# Patient Record
Sex: FEMALE | Race: WHITE
[De-identification: names, ages, dates, MRNs, and addresses within clinical notes are randomized per-mention and may not be internally consistent; named-entity substitution may affect disease eponyms.]

---

## 2020-07-23 ENCOUNTER — HOSPITAL ENCOUNTER (INPATIENT)
Dept: HOSPITAL 1 - ED | Age: 50
LOS: 2 days | Discharge: HOME | DRG: 871 | End: 2020-07-25
Attending: HOSPITALIST | Admitting: HOSPITALIST
Payer: COMMERCIAL

## 2020-07-23 VITALS — DIASTOLIC BLOOD PRESSURE: 86 MMHG | SYSTOLIC BLOOD PRESSURE: 124 MMHG

## 2020-07-23 VITALS — WEIGHT: 197 LBS | BODY MASS INDEX: 36.25 KG/M2 | HEIGHT: 62 IN

## 2020-07-23 VITALS — SYSTOLIC BLOOD PRESSURE: 115 MMHG | DIASTOLIC BLOOD PRESSURE: 82 MMHG

## 2020-07-23 DIAGNOSIS — J12.89: ICD-10-CM

## 2020-07-23 DIAGNOSIS — J96.01: ICD-10-CM

## 2020-07-23 DIAGNOSIS — A41.9: Primary | ICD-10-CM

## 2020-07-23 DIAGNOSIS — U07.1: ICD-10-CM

## 2020-07-23 DIAGNOSIS — Z88.5: ICD-10-CM

## 2020-07-23 DIAGNOSIS — Z88.8: ICD-10-CM

## 2020-07-23 DIAGNOSIS — N17.0: ICD-10-CM

## 2020-07-23 LAB
ALBUMIN SERPL-MCNC: 3.8 G/DL (ref 3.4–5)
ALP SERPL-CCNC: 110 U/L (ref 46–116)
ALT SERPL-CCNC: 31 U/L (ref 14–59)
AST SERPL-CCNC: 25 U/L (ref 15–37)
BASOPHILS NFR BLD: 0.3 % (ref 0–2)
BILIRUB SERPL-MCNC: 0.62 MG/DL (ref 0.2–1)
BUN SERPL-MCNC: 11 MG/DL (ref 7–18)
CALCIUM SERPL-MCNC: 9.2 MG/DL (ref 8.5–10.1)
CHLORIDE SERPL-SCNC: 104 MMOL/L (ref 98–107)
CHOLEST SERPL-MCNC: 159 MG/DL (ref ?–200)
CHOLEST/HDLC SERPL: 2.6 MG/DL
CO2 SERPL-SCNC: 27.9 MMOL/L (ref 21–32)
CREAT SERPL-MCNC: 0.9 MG/DL (ref 0.6–1)
CRP SERPL-MCNC: 3.8 MG/DL (ref ?–0.9)
ERYTHROCYTE [DISTWIDTH] IN BLOOD BY AUTOMATED COUNT: 15 % (ref 11.5–14.5)
GFR SERPLBLD BASED ON 1.73 SQ M-ARVRAT: > 60 ML/MIN
GLUCOSE SERPL-MCNC: 92 MG/DL (ref 74–106)
HDLC SERPL-MCNC: 62 MG/DL (ref 40–60)
LDH SERPL-CCNC: 228 U/L (ref 100–190)
MAGNESIUM SERPL-MCNC: 2.1 MG/DL (ref 1.8–2.4)
MICROSCOPIC UR-IMP: YES
PHOSPHATE SERPL-MCNC: 4 MG/DL (ref 2.5–4.9)
PLATELET # BLD: 284 X10^3MCL (ref 130–400)
POTASSIUM SERPL-SCNC: 3.7 MMOL/L (ref 3.5–5.1)
PROT SERPL-MCNC: 8.5 G/DL (ref 6.4–8.2)
RBC # UR STRIP.AUTO: NEGATIVE /UL
SODIUM SERPL-SCNC: 142 MMOL/L (ref 136–145)
T3 SERPL-MCNC: 0.96 NG/ML
T3RU NFR SERPL: 28 % UPTAKE (ref 30–39)
T4 FREE SERPL-MCNC: 0.94 NG/DL (ref 0.76–1.46)
T4 SERPL-MCNC: 7.1 UG/DL (ref 4.7–13.3)
T4/T3 UPTAKE INDEX SERPL: 2 UG/DL (ref 1.4–4.5)
TRIGL SERPL-MCNC: 60 MG/DL (ref ?–150)
UA SPECIFIC GRAVITY: >=1.03 (ref 1–1.03)

## 2020-07-23 PROCEDURE — G0378 HOSPITAL OBSERVATION PER HR: HCPCS

## 2020-07-23 NOTE — NUR
DID NOT RECEIVE ABX FROM PHARMACY. CALLED 3X AND ALERTED CHARGE NURSE, WHO
ALSO CALLED.  ADMINISTRATION TIME WAS CHANGED BY PHARMACY TO 2100. WILL BE
ADMINISTERED BY NIGHT NURSE. PT WAS IN PAIN 7/10 (HEADACHE) AND WAS GIVEN
TYLENOL.  TEMP WAS ALSO 99.7. TYLENOL RELIEVED BOTH CONDITIONS. PT DENIED
PAIN ON REASSESSMENT. IV EQUIPMENT WITH FLUIDS WERE SET UP FOR IV ABX. FAMILY
MEMBER DROPPED OF PHONE  AND PERSONAL ITEMS, WHICH WERE GIVEN TO PT. PT
CURRENTLY COMFORTABLE. ON RA WITH 96% SAO2. NO ACUTE DISTRESS NOTED. DRY COUGH
STILL PRESENT, BUT REPORTED BY PT THAT IT HAD IMPROVED AFTER SOME REST.
COVID-19 TEST WAS ADMINISTERED AND DELIVERED TO LAB.  SAFETY PRECAUTIONS IN
PLACE. ENDORSED TO NIGHT NURSE.

## 2020-07-23 NOTE — NUR
PT BIB SELF C/C BODY ACHES COUGH ABD PAIN WITH N/V STS CRAMPING ALL OVER
AWAITING FOR DR NELY HICKEY

## 2020-07-23 NOTE — NUR
REPORT GIVEN FROM DAY RN. AXOX4. BREATHING EVEN ON RA SPO2 97% NO S/S OF
DISTRESS OR DISCOMFORT. PT IS  HAVING A NON PRODUCTIVE COUGH. GENERAL
WEAKNESS. RAC 20G SL PATENT, FLUSHED. TELE 49 NSR AT THIS TIME. HYPO ACTIVE
BOWELS. SOFT , NON TENDER ABD. CLEAR SKIN WARM AND DRY. STRONG PEDAL AND
RADIAL PULSES. VOIDS FREELY IN THE RESTROOM. BED IN LOW POSIION, SIDERAILS
UPX2, CALL LIGHT WITHIN REACH.

## 2020-07-23 NOTE — NUR
**PLEASE ENTER FULL NAMES OF LVN/RN**
 
          Patient data collected by (LVN):          ANTWAN BORJAS 
Assessment reviewed and completed by (RN):          FARZAD CONTI

## 2020-07-23 NOTE — NUR
PT RESTING. EYES CLOSED. AXOX4 NO S/S OF DISTRESS OR DISCOMFORT. BREATHING
EVEN ON 2L NC 98% IV ABX TOLERATED. MEDICATIONS GIVEN AS ORDERED. BED IN LOW
POSITION, SIDE RAILS UPX2, CALL LIGHT WITHIN REACH.

## 2020-07-24 VITALS — SYSTOLIC BLOOD PRESSURE: 117 MMHG | DIASTOLIC BLOOD PRESSURE: 75 MMHG

## 2020-07-24 VITALS — DIASTOLIC BLOOD PRESSURE: 78 MMHG | SYSTOLIC BLOOD PRESSURE: 108 MMHG

## 2020-07-24 VITALS — SYSTOLIC BLOOD PRESSURE: 129 MMHG | DIASTOLIC BLOOD PRESSURE: 78 MMHG

## 2020-07-24 VITALS — SYSTOLIC BLOOD PRESSURE: 126 MMHG | DIASTOLIC BLOOD PRESSURE: 76 MMHG

## 2020-07-24 VITALS — SYSTOLIC BLOOD PRESSURE: 115 MMHG | DIASTOLIC BLOOD PRESSURE: 77 MMHG

## 2020-07-24 LAB
BASOPHILS NFR BLD: 0.4 % (ref 0–2)
BUN SERPL-MCNC: 11 MG/DL (ref 7–18)
CALCIUM SERPL-MCNC: 8.5 MG/DL (ref 8.5–10.1)
CHLORIDE SERPL-SCNC: 108 MMOL/L (ref 98–107)
CO2 SERPL-SCNC: 28 MMOL/L (ref 21–32)
CREAT SERPL-MCNC: 0.7 MG/DL (ref 0.6–1)
ERYTHROCYTE [DISTWIDTH] IN BLOOD BY AUTOMATED COUNT: 15 % (ref 11.5–14.5)
GFR SERPLBLD BASED ON 1.73 SQ M-ARVRAT: > 60 ML/MIN
GLUCOSE SERPL-MCNC: 79 MG/DL (ref 74–106)
PLATELET # BLD: 247 X10^3MCL (ref 130–400)
POTASSIUM SERPL-SCNC: 4.3 MMOL/L (ref 3.5–5.1)
SODIUM SERPL-SCNC: 144 MMOL/L (ref 136–145)

## 2020-07-24 NOTE — NUR
SCHEDULED MEDS GIVEN AND TOLERATED WELL. TYLENOL PO PRN GIVEN FOR NON
RADIATING C/P. PT REPOSITIONED FOR COMFORT. WILL CONTINUE TO MONITOR.

## 2020-07-24 NOTE — NUR
PT TOLERATED MEDS WELL. PT REFUSED COLACE STATING SHE ALREADY HAS DIARRHEA AND
DOES NOT WANT A STOOL SOFTENER. PT IS ON ROOM AIR AND DENIES SOB. PT HAS NO
OTHER NEEDS AT THIS TIME AND HAS CALL LIGHT IN REACH. IV PATENT AND NO SIGNS
OF REDNESS OR SWELLING.

## 2020-07-24 NOTE — NUR
PT RESTING, EYES CLOSED. NO S/S OF DISTRESS OR DISCOMFORT. IV C/D/I NO S/S OF
INFILTRATION. PT BREATHING EVEN AND UNLABORED ON RA 99% SPO2. TELE 49, NSR NO
CP. BED IN LOW POSITION, SIDE RAILS UPX2 CALL LIGHT WITHIN REACH. PT AXOX4.

## 2020-07-24 NOTE — NUR
PT IS AAOX4. TELE 49 IN PLACE READING NSR. RESP EVEN AND UNLABORED.
 ON R/A WITH DRY COUGH NOTED. IV CATH TO RAC PATENT, SITE WNL. DENIES PAIN.
WILL ENDORSE ALL CARE TO NOC RN. BED IN LOWEST POSTION. CALL LIGHT WITHIN
REACH. SIDE RAILS UP X2.

## 2020-07-24 NOTE — NUR
PT IS AA0X4. TELE 49 IN PLACE READING NSR. RESP EVEN AND UNLABORED. LUNG
SOUNDS DIMINISHED BILATERAL BASES. ON R/A WITH DRY COUGH NOTED. NO SOB.
IV CATH TO RAC S/L, PATENT. SITE WNL. ABDOMEN SOFT, NONTENDER, NONDISTENDED.
BOWEL SOUNDS ACTIVE X 4 QUADS. PT HAS C/O OF POOR APPETITE AND GASTRIC
DISCOMFORT. WILL CONTINUE TO MONITOR. CALL LIGHT WITHIN REACH. BED IN LOWEST
POSITION.

## 2020-07-24 NOTE — NUR
RECIVED PT FROM AM NURSE. CALLED INTO ROOM. PT HAS NO NEEDS AT THIS TIME. WILL
FOLLOW UP WITH PM ASSESSMENT AND MEDS.

## 2020-07-24 NOTE — NUR
PT SITTING UP AT BEDSIDE EATING LUNCH. NO RESP DISTRESS NOTED. CALL LIGHT
WITHIN REACH. DENIES PAIN. PT GIVEN DIABETIC TEACHING PAMPLET FOR EDUCATION
AND SCHEDULED CLASSES.

## 2020-07-25 VITALS — SYSTOLIC BLOOD PRESSURE: 110 MMHG | DIASTOLIC BLOOD PRESSURE: 74 MMHG

## 2020-07-25 VITALS — DIASTOLIC BLOOD PRESSURE: 104 MMHG | SYSTOLIC BLOOD PRESSURE: 139 MMHG

## 2020-07-25 LAB
BASOPHILS NFR BLD: 0.4 % (ref 0–2)
BUN SERPL-MCNC: 13 MG/DL (ref 7–18)
CALCIUM SERPL-MCNC: 8.9 MG/DL (ref 8.5–10.1)
CHLORIDE SERPL-SCNC: 108 MMOL/L (ref 98–107)
CO2 SERPL-SCNC: 27.8 MMOL/L (ref 21–32)
CREAT SERPL-MCNC: 0.7 MG/DL (ref 0.6–1)
CRP SERPL-MCNC: 3.7 MG/DL (ref ?–0.9)
ERYTHROCYTE [DISTWIDTH] IN BLOOD BY AUTOMATED COUNT: 15.2 % (ref 11.5–14.5)
GFR SERPLBLD BASED ON 1.73 SQ M-ARVRAT: > 60 ML/MIN
GLUCOSE SERPL-MCNC: 84 MG/DL (ref 74–106)
PLATELET # BLD: 272 X10^3MCL (ref 130–400)
POTASSIUM SERPL-SCNC: 3.7 MMOL/L (ref 3.5–5.1)
SODIUM SERPL-SCNC: 146 MMOL/L (ref 136–145)

## 2020-07-25 NOTE — NUR
PT SLEPT IN INTERVALS THROUGHOUT THE NIGHT. PT REPORTED HEADACHE THIS AM AND
WAS MEDICATED PER EMAR. PT ALSO REPORTED INCREASED COUGHING DURING THE NIGHT
AND REQUESTED COUGH MEDICINE. RESIDENT WAS NOTIFIED AND ROBITUSSIN PO WAS
OBTAINED AND ADMINISTERED. PT HAS BEEN LAYING PRONE AS TOLERATED AND IS STILL
ON ROOM AIR. PT DENIES SOB AND DIFFICULTY BREATHING. PT INSTRUCTED TO ALERT
NURSE IF BECOMING SOB. PT HAS CALL LIGHT IN REACH, WILL ENDORSE CARE TO AM
NURSE.

## 2020-07-25 NOTE — NUR
PT IS AAOX4. TELE 49 IN PLACE READING NSR. RESP EVEN, SHALLOW, UNLABORED. LUNG
SOUNDS DIMINISHED BILATERAL BASES. ON R/A. DRY COUGH NOTED. IV CATH TO RAC
FLUSHED AND PATENT. SITE WNL. DENIES PAIN. NO DISTRESS NOTED. CALL LIGHT
WITHIN REACH. BED IN LOWEST POSITION. DROPLET PRECAUTIONS WILL BE MAINTAINED
THROUGH OUT THE DAY.

## 2020-07-25 NOTE — NUR
PT DISCHARGED TO HOME IN NO DISTRESS. DISCHARGE INSTRUCTIONS REVIEWED. ALL
QUESTIONS AND CONCERNS ADDRESS. PT INFORMED THAT ALL PRESCRIPTIONS ORDERED
HAVE BEEN SENT TO THE PT'S INDICATED PHARMACY. TELE 49 REMOVED AND GIVEN TO
MONITOR TECH. IV CATH TO RAC REMOVED INTACT. SITE WNL.  COVERED WITH CDI
DRESSING. PT DENIES PAIN UPON DISCHARGE, V/S WNL. PT TOOK ALL PERSONAL
BELONGINGS. PT TAKEN TO LOBBY IN W/C ACCOMPAINED BY THIS RN.

## 2020-07-25 NOTE — NUR
ADMINISTERED ROBITUSSIN PO TO PATIENT FOR COUGH. PT TOLERATED WELL. PT ON
ROOM AIR, DENIES SOB AND DIFFICULTY BREATHING. WILL CONTINUE TO MONITOR.

## 2020-07-25 NOTE — NUR
ROBITUSSIN PO PRN GIVEN FOR DRY, NONPRODUCTIVE COUGH. HEPARIN SQ GIVEN TO LUQ.
SITE WNL. DECADRON IVP GIVEN. MEDICATIONS TOLERATED WELL. RESP EQUAL AND
UNLABORED. DENIES PAIN. CALL LIGHT WITHIN REACH. DROPLET PRECAUTIONS
MAINTAINED.

## 2020-08-10 ENCOUNTER — HOSPITAL ENCOUNTER (EMERGENCY)
Dept: HOSPITAL 1 - ED | Age: 50
Discharge: HOME | End: 2020-08-10
Payer: COMMERCIAL

## 2020-08-10 VITALS
BODY MASS INDEX: 36.8 KG/M2 | WEIGHT: 200 LBS | BODY MASS INDEX: 36.8 KG/M2 | HEIGHT: 62 IN | HEIGHT: 62 IN | WEIGHT: 200 LBS

## 2020-08-10 VITALS — DIASTOLIC BLOOD PRESSURE: 84 MMHG | SYSTOLIC BLOOD PRESSURE: 124 MMHG

## 2020-08-10 DIAGNOSIS — M79.661: Primary | ICD-10-CM

## 2020-08-10 DIAGNOSIS — Z88.8: ICD-10-CM

## 2020-08-10 DIAGNOSIS — Z88.5: ICD-10-CM

## 2020-08-10 DIAGNOSIS — Z98.84: ICD-10-CM

## 2020-08-10 DIAGNOSIS — R25.2: ICD-10-CM

## 2020-12-17 ENCOUNTER — HOSPITAL ENCOUNTER (EMERGENCY)
Dept: HOSPITAL 1 - ED | Age: 50
Discharge: HOME | End: 2020-12-17
Payer: COMMERCIAL

## 2020-12-17 VITALS
HEIGHT: 60 IN | HEIGHT: 60 IN | BODY MASS INDEX: 39.27 KG/M2 | BODY MASS INDEX: 39.27 KG/M2 | WEIGHT: 200 LBS | WEIGHT: 200 LBS

## 2020-12-17 VITALS — SYSTOLIC BLOOD PRESSURE: 111 MMHG | DIASTOLIC BLOOD PRESSURE: 71 MMHG

## 2020-12-17 DIAGNOSIS — Z88.8: ICD-10-CM

## 2020-12-17 DIAGNOSIS — Z88.5: ICD-10-CM

## 2020-12-17 DIAGNOSIS — R06.02: ICD-10-CM

## 2020-12-17 DIAGNOSIS — R09.89: ICD-10-CM

## 2020-12-17 DIAGNOSIS — Z98.890: ICD-10-CM

## 2020-12-17 DIAGNOSIS — R05: Primary | ICD-10-CM

## 2020-12-17 LAB
ALBUMIN SERPL-MCNC: 3.7 G/DL (ref 3.4–5)
ALP SERPL-CCNC: 115 U/L (ref 46–116)
ALT SERPL-CCNC: 30 U/L (ref 14–59)
AST SERPL-CCNC: 20 U/L (ref 15–37)
BASOPHILS NFR BLD: 0.7 % (ref 0.2–1.3)
BILIRUB SERPL-MCNC: 0.54 MG/DL (ref 0.2–1)
BUN SERPL-MCNC: 14 MG/DL (ref 7–18)
CALCIUM SERPL-MCNC: 9.3 MG/DL (ref 8.5–10.1)
CHLORIDE SERPL-SCNC: 105 MMOL/L (ref 98–107)
CO2 SERPL-SCNC: 25.7 MMOL/L (ref 21–32)
CREAT SERPL-MCNC: 0.8 MG/DL (ref 0.6–1)
ERYTHROCYTE [DISTWIDTH] IN BLOOD BY AUTOMATED COUNT: 15.3 % (ref 12.3–17.7)
GFR SERPLBLD BASED ON 1.73 SQ M-ARVRAT: > 60 ML/MIN
GLUCOSE SERPL-MCNC: 115 MG/DL (ref 74–106)
PLATELET # BLD: 543 X10^3MCL (ref 179–408)
POTASSIUM SERPL-SCNC: 4.4 MMOL/L (ref 3.5–5.1)
PROT SERPL-MCNC: 8.3 G/DL (ref 6.4–8.2)
RBC MORPH BLD: NORMAL
SODIUM SERPL-SCNC: 142 MMOL/L (ref 136–145)